# Patient Record
(demographics unavailable — no encounter records)

---

## 2025-02-17 NOTE — DISCUSSION/SUMMARY
[EKG obtained to assist in diagnosis and management of assessed problem(s)] : EKG obtained to assist in diagnosis and management of assessed problem(s) [FreeTextEntry1] : In summary, this is a 66 year old man with NICM, HTN, psoriasis, ventral hernia, obesity and BiV ICD implant in 2013, EF at the time 7%. Presents today for initial evaluation would like to transfer EP care as his device nears SHANA. Device checked in office today with 11 months to SHANA. Normal function and measured data all within normal limits. He will continue remote monitoring and return to device clinic in 8 months to reassess device and battery life. The rationale for device generator replacement as well as the procedural risks--including but not limited to pocket hematoma, infection, pneumothorax, pericardial bleed/tamponade, lead dislodgement, and death--were reviewed in detail. After consideration of this information, the decision was made to proceed with device generator change once indicated.  Mr. Gatica appeared to understand the whole discussion and verbalized that all of his questions were answered to his satisfaction.  Thank you for allowing me to be involved in the care of this pleasant man. Please feel free to contact me with any questions.

## 2025-02-17 NOTE — HISTORY OF PRESENT ILLNESS
[FreeTextEntry1] : Referring Physician: Dr. Aranda   Dear Dr. Aranda   Mr. Gatica was seen in the [facility] today. For our records, please allow me to summarize the history and my findings.   This pleasant 66-year-old man has a history significant for NICM, HTN, psoriasis, ventral hernia, obesity and ICD implant in 2013, EF at the time 7%. Presents today for initial evaluation would like to transfer EP care as his device nears [facility]. Device checked in office today with 11 months to SHANA. Normal function and measured data all within normal limits.    Mr. Gatica denies any recent history of chest pain, shortness of breath, palpitations, dizziness, or syncope.

## 2025-02-17 NOTE — HISTORY OF PRESENT ILLNESS
[FreeTextEntry1] : Referring Physician: Dr. Aranda   Dear Dr. Aranda   Mr. Gatica was seen in the [facility] Electrophysiology Clinic today. For our records, please allow me to summarize the history and my findings.   This pleasant 66-year-old man has a history significant for NICM, HTN, psoriasis, ventral hernia, obesity and ICD implant in 2013, EF at the time 7%. Presents today for initial evaluation would like to transfer EP care as his device nears [facility]. Device checked in office today with 11 months to SHANA. Normal function and measured data all within normal limits.    Mr. Gatica denies any recent history of chest pain, shortness of breath, palpitations, dizziness, or syncope.